# Patient Record
Sex: FEMALE | Race: WHITE
[De-identification: names, ages, dates, MRNs, and addresses within clinical notes are randomized per-mention and may not be internally consistent; named-entity substitution may affect disease eponyms.]

---

## 2018-04-24 ENCOUNTER — HOSPITAL ENCOUNTER (EMERGENCY)
Dept: HOSPITAL 33 - ED | Age: 33
Discharge: HOME | End: 2018-04-24
Payer: COMMERCIAL

## 2018-04-24 VITALS — DIASTOLIC BLOOD PRESSURE: 86 MMHG | SYSTOLIC BLOOD PRESSURE: 136 MMHG | HEART RATE: 76 BPM

## 2018-04-24 VITALS — OXYGEN SATURATION: 98 %
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
## 2018-04-24 DIAGNOSIS — S93.402A: Primary | ICD-10-CM
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
## 2018-04-24 PROCEDURE — 99282 EMERGENCY DEPT VISIT SF MDM: CPT

## 2018-04-24 PROCEDURE — 73610 X-RAY EXAM OF ANKLE: CPT

## 2018-04-24 PROCEDURE — 99283 EMERGENCY DEPT VISIT LOW MDM: CPT

## 2018-04-24 NOTE — ERPHSYRPT
- History of Present Illness


Time Seen by Provider: 04/24/18 15:35


Source: patient


Exam Limitations: no limitations


Patient Subjective Stated Complaint: Pt states "I was walking down the bus 

steps and I fell down the steps, my foot went to the inside and I went to the 

ground and my left ankle hurts really bad."


Triage Nursing Assessment: Pt alert and oriented X 3, skin pwd.  pt left ankle 

slightly swollen laterally, no bruising noted.  CSM X 4


Physician History: 





Pt states, she lost her step getting off the bus, twisted her left ankle, 

denies falling, other injury or complaints. She is indicating pain in the 

lateral ankle area, when walking.


Method of Injury: twisted


Occurred: just prior to arrival


Quality: constant


Severity of Pain-Max: moderate


Severity of Pain-Current: moderate


Lower Extremities Pain: ankle: left


Modifying Factors: Improves With: movement


Associated Symptoms: none


Allergies/Adverse Reactions: 








No Known Drug Allergies Allergy (Unverified 04/24/18 15:31)


 





Home Medications: 








Diphenhydramine HCl [Benadryl] 25 mg PO DAILY 04/24/18 [History]


Montelukast Sodium [Singulair] 10 mg PO DAILY 04/24/18 [History]





Hx Tetanus, Diphtheria Vaccination/Date Given: Yes


Hx Influenza Vaccination/Date Given: Yes


Hx Pneumococcal Vaccination/Date Given: No


Immunizations Up to Date: Yes





- Review of Systems


Constitutional: No Symptoms


Musculoskeletal: Other (lefl ankle pain)





- Past Medical History


Pertinent Past Medical History: No


Neurological History: No Pertinent History





- Past Surgical History


Past Surgical History: Yes


Other Surgical History: c section





- Social History


Smoking Status: Never smoker


Exposure to second hand smoke: No


Drug Use: none


Patient Lives Alone: No





- Female History


Hx Last Menstrual Period: 04/02/2018


Hx Pregnant Now: No





- Nursing Vital Signs


Nursing Vital Signs: 


 Initial Vital Signs











Temperature  98.1 F   04/24/18 15:24


 


Pulse Rate  100 H  04/24/18 15:24


 


Respiratory Rate  18   04/24/18 15:24


 


Blood Pressure  149/90   04/24/18 15:24


 


O2 Sat by Pulse Oximetry  98   04/24/18 15:24








 Pain Scale











Pain Intensity                 5

















- Physical Exam


General Appearance: no apparent distress


Eyes, Ears, Nose, Throat Exam: normal ENT inspection


Neck Exam: normal inspection, non-tender


Cardiovascular/Respiratory Exam: chest non-tender, normal breath sounds, 

regular rate/rhythm, heart sounds normal


Gastrointestinal/Abdominal Exam: non-tender, soft


Back Exam: normal inspection, No CVA tenderness


Ankle Exam: left ankle: pain, soft tissue tenderness (mild on the dorsal foot, 

anterior from the lateral ankle, no swelling, bruise, deformity)


Foot Exam: left foot: non-tender


Neuro/Tendon Exam: normal motor functions


Mental Status Exam: alert, oriented x 3


Skin Exam: normal color, warm, dry


**SpO2 Interpretation**: normal


SpO2: 98


Oxygen Delivery: Room Air





- Course


Nursing assessment & vital signs reviewed: Yes





- Radiology Exams


  ** Ankle


X-ray Interpretation: Interpreted by me, Negative


Ordered Tests: 


 Active Orders 24 hr











 Category Date Time Status


 


 ANKLE (3 VIEWS) Stat Exams  04/24/18 15:48 Completed














- Progress


Progress: unchanged


Progress Note: 





04/24/18 16:21


I explained X ray and diagnosis to patient and her , she is getting 

discharged with instructions to rest x1-2 days, keep legt leg elevated, and 

follow up with her doctor in 2-3 days, or to see a Podiatrist. She was advised 

to take Advil, or Tylenol PRN.





- Departure


Time of Disposition: 16:23


Departure Disposition: Home


Clinical Impression: 


Ankle sprain


Qualifiers:


 Encounter type: initial encounter Involved ligament of ankle: anterior 

talofibular ligament Laterality: left Qualified Code(s): S93.492A - Sprain of 

other ligament of left ankle, initial encounter





Condition: Stable


Critical Care Time: No


Referrals: 


COSTA MALONE MD [Primary Care Provider] - 


Instructions:  Ankle Sprain (DC)


Additional Instructions: 


Rest x 1-2 days with elevate leg, apply ice or cold compresses to swelling, 

return if severe pain, swelling, discoloration, coldness of the toes!

## 2018-04-24 NOTE — XRAY
Indication: Lateral ankle pain following twisting injury.



Comparison: None



3 views of the left ankle demonstrates 3 mm linear ossification lateral to the

talus without soft tissue swelling either degenerative versus old injury.

Minimal talonavicular degenerative spurring.  No other bony, articular, or

soft tissue abnormalities.